# Patient Record
Sex: FEMALE | Race: WHITE
[De-identification: names, ages, dates, MRNs, and addresses within clinical notes are randomized per-mention and may not be internally consistent; named-entity substitution may affect disease eponyms.]

---

## 2017-09-03 ENCOUNTER — HOSPITAL (OUTPATIENT)
Dept: OTHER | Age: 67
End: 2017-09-03
Attending: EMERGENCY MEDICINE

## 2018-08-18 ENCOUNTER — HOSPITAL (OUTPATIENT)
Dept: OTHER | Age: 68
End: 2018-08-18
Attending: EMERGENCY MEDICINE

## 2018-08-18 LAB
APPEARANCE UR: CLEAR
BILIRUB UR QL STRIP: NEGATIVE
COLOR UR: YELLOW
GLUCOSE UR STRIP-MCNC: NEGATIVE MG/DL
HEMOCCULT STL QL: ABNORMAL
KETONES UR STRIP-MCNC: NEGATIVE MG/DL
LEUKOCYTE ESTERASE UR QL STRIP: NEGATIVE
NITRITE UR QL STRIP: NEGATIVE
PH UR STRIP: 6 UNIT (ref 5–7)
PROT UR STRIP-MCNC: NEGATIVE MG/DL
SP GR UR STRIP: 1.01 (ref 1–1.03)
UROBILINOGEN UR STRIP-MCNC: 0.2 MG/DL (ref 0–1)

## 2018-12-23 ENCOUNTER — HOSPITAL (OUTPATIENT)
Dept: OTHER | Age: 68
End: 2018-12-23
Attending: EMERGENCY MEDICINE

## 2018-12-23 LAB
FLUAV AG SPEC QL IA: ABNORMAL
FLUAV AG SPEC QL IA: POSITIVE
FLUBV AG SPEC QL IF: NEGATIVE
SPECIMEN SOURCE: ABNORMAL

## 2019-09-24 ENCOUNTER — HOSPITAL (OUTPATIENT)
Dept: OTHER | Age: 69
End: 2019-09-24
Attending: EMERGENCY MEDICINE

## 2019-10-10 ENCOUNTER — TELEPHONE (OUTPATIENT)
Dept: NEUROLOGY | Facility: CLINIC | Age: 69
End: 2019-10-10

## 2019-10-24 ENCOUNTER — OFFICE VISIT (OUTPATIENT)
Dept: NEUROLOGY | Facility: CLINIC | Age: 69
End: 2019-10-24
Payer: MEDICARE

## 2019-10-24 VITALS
SYSTOLIC BLOOD PRESSURE: 122 MMHG | RESPIRATION RATE: 16 BRPM | BODY MASS INDEX: 27.19 KG/M2 | HEART RATE: 74 BPM | HEIGHT: 59.75 IN | DIASTOLIC BLOOD PRESSURE: 60 MMHG | OXYGEN SATURATION: 98 % | WEIGHT: 138.5 LBS | TEMPERATURE: 98 F

## 2019-10-24 DIAGNOSIS — R20.2 PARESTHESIA OF BOTH FEET: ICD-10-CM

## 2019-10-24 DIAGNOSIS — I87.2 VENOUS INSUFFICIENCY: ICD-10-CM

## 2019-10-24 PROCEDURE — 99204 OFFICE O/P NEW MOD 45 MIN: CPT | Performed by: OTHER

## 2019-10-24 RX ORDER — LEVOTHYROXINE SODIUM 88 UG/1
TABLET ORAL
COMMUNITY
Start: 2019-09-18

## 2019-10-24 RX ORDER — GABAPENTIN 100 MG/1
100 CAPSULE ORAL 2 TIMES DAILY
Qty: 30 CAPSULE | Refills: 2 | Status: SHIPPED | OUTPATIENT
Start: 2019-10-24 | End: 2019-10-24

## 2019-10-24 RX ORDER — LISINOPRIL 5 MG/1
TABLET ORAL
COMMUNITY
Start: 2019-10-07

## 2019-10-24 RX ORDER — GABAPENTIN 100 MG/1
100 CAPSULE ORAL 2 TIMES DAILY
Qty: 60 CAPSULE | Refills: 2 | Status: SHIPPED | OUTPATIENT
Start: 2019-10-24

## 2019-10-24 RX ORDER — AMLODIPINE BESYLATE 5 MG/1
TABLET ORAL
COMMUNITY
Start: 2019-10-07

## 2019-10-24 NOTE — PROGRESS NOTES
HPI:    Patient ID: Gypsy Cook is a 77year old female. PCP: Dr Itzel Gutierrez    Thank you for referring this patient to us.  Below is the summary of my evaluation    HPI  Page Yohana is a 77year old nondiabetic female who presents for evaluation of par Rfl:   lisinopril 5 MG Oral Tab, , Disp: , Rfl:       Allergies:  Penicillins             SWELLING  Vicodin [Hydrocodon*    SWELLING  Celebrex [Celecoxib]    OTHER (SEE COMMENTS)    Comment:Headaches  Dilaudid [Hydrom[employer]*    OTHER (SEE COMMENTS)    Comment PROTEIN STUDY, MYESHA,         DIRECT, REFLEX TO 9 ENAS, SED RATE, WESTERGREN         (AUTOMATED), VITAMIN B6      (I87.2) Venous insufficiency  Plan: US VENOUS DOPPLER LEG BILAT - DIAG IMG         (CPT=93970)    Paresthesia of both feet possible small fiber

## 2019-10-25 ENCOUNTER — APPOINTMENT (OUTPATIENT)
Dept: LAB | Age: 69
End: 2019-10-25
Attending: Other
Payer: MEDICARE

## 2019-10-25 DIAGNOSIS — R20.2 PARESTHESIA OF BOTH FEET: ICD-10-CM

## 2019-10-25 PROCEDURE — 84207 ASSAY OF VITAMIN B-6: CPT

## 2019-10-25 PROCEDURE — 86334 IMMUNOFIX E-PHORESIS SERUM: CPT

## 2019-10-25 PROCEDURE — 36415 COLL VENOUS BLD VENIPUNCTURE: CPT

## 2019-10-25 PROCEDURE — 86038 ANTINUCLEAR ANTIBODIES: CPT

## 2019-10-25 PROCEDURE — 84165 PROTEIN E-PHORESIS SERUM: CPT

## 2019-10-25 PROCEDURE — 83883 ASSAY NEPHELOMETRY NOT SPEC: CPT

## 2019-10-25 PROCEDURE — 85652 RBC SED RATE AUTOMATED: CPT

## 2019-10-25 PROCEDURE — 82607 VITAMIN B-12: CPT

## 2019-11-04 ENCOUNTER — TELEPHONE (OUTPATIENT)
Dept: NEUROLOGY | Facility: CLINIC | Age: 69
End: 2019-11-04

## 2019-11-04 DIAGNOSIS — D47.2 MGUS (MONOCLONAL GAMMOPATHY OF UNKNOWN SIGNIFICANCE): ICD-10-CM

## 2019-11-04 DIAGNOSIS — R77.9 ABNORMALITY OF PLASMA PROTEIN: Primary | ICD-10-CM

## 2019-11-05 NOTE — TELEPHONE ENCOUNTER
Laci Mejia MD  You 10 minutes ago (1:32 PM)     Patient notified of lab results as stated below by Dr Natalia Grande and given #'s for hematology. Hematology referral placed. M spike in gamma region.  Refer to hematology for MGUS ( monoclonal gammap

## 2019-11-14 ENCOUNTER — OFFICE VISIT (OUTPATIENT)
Dept: HEMATOLOGY/ONCOLOGY | Age: 69
End: 2019-11-14
Attending: INTERNAL MEDICINE
Payer: MEDICARE

## 2019-11-14 VITALS
OXYGEN SATURATION: 98 % | BODY MASS INDEX: 27 KG/M2 | SYSTOLIC BLOOD PRESSURE: 145 MMHG | HEART RATE: 92 BPM | WEIGHT: 138 LBS | RESPIRATION RATE: 18 BRPM | TEMPERATURE: 98 F | DIASTOLIC BLOOD PRESSURE: 80 MMHG

## 2019-11-14 DIAGNOSIS — D47.2 MONOCLONAL GAMMOPATHY: Primary | ICD-10-CM

## 2019-11-14 PROCEDURE — 99205 OFFICE O/P NEW HI 60 MIN: CPT | Performed by: INTERNAL MEDICINE

## 2019-11-14 NOTE — PATIENT INSTRUCTIONS
For Dr. Francisco Cullen nurse line, call [phone] with any questions or concerns,  Monday through Friday 8:00 to 4:30, After hours or weekends for urgent needs.

## 2019-11-14 NOTE — CONSULTS
[facility]cer Center Report of Consultation    Patient Name: Tiffanie Marlow   YOB: 1950   Medical Record Number: [id number]   CSN: [id number]   Consulting Physician: Pro Gupta M.D. Referring Physician: No ref.  provider found    Date o together: Not on file        Attends [employer] service: Not on file        Active member of club or organization: Not on file        Attends meetings of clubs or organizations: Not on file        Relationship status: Not on file      Intimate partner viole heartburn or early satiety. Genitorurinary  Normal - No hematuria, dysuria, increased frequency, urgency, hesitancy or incontinence. Integumentary Normal - No chronic rashes, inflammation, ulcerations or skin changes.    Neurologic Normal - No headache, three. Coherent speech. Verbalizes understanding of our discussions today.        Laboratory:  Lab Results   Component Value Date    WBC 9.4 11/14/2019    RBC 4.59 11/14/2019    HGB 12.6 11/14/2019    HCT 39.0 11/14/2019    MCV 85.0 11/14/2019    MCH 27.5 1

## 2019-11-15 ENCOUNTER — HOSPITAL ENCOUNTER (OUTPATIENT)
Dept: GENERAL RADIOLOGY | Age: 69
Discharge: HOME | End: 2019-11-15
Attending: INTERNAL MEDICINE
Payer: MEDICARE

## 2019-11-15 DIAGNOSIS — D47.2 MONOCLONAL GAMMOPATHY: ICD-10-CM

## 2019-11-15 PROCEDURE — 77075 RADEX OSSEOUS SURVEY COMPL: CPT | Performed by: INTERNAL MEDICINE

## 2019-11-26 ENCOUNTER — APPOINTMENT (OUTPATIENT)
Dept: LAB | Age: 69
End: 2019-11-26
Attending: INTERNAL MEDICINE
Payer: MEDICARE

## 2019-11-26 DIAGNOSIS — D47.2 MONOCLONAL GAMMOPATHY: ICD-10-CM

## 2019-11-26 PROCEDURE — 83883 ASSAY NEPHELOMETRY NOT SPEC: CPT

## 2019-11-26 PROCEDURE — 84156 ASSAY OF PROTEIN URINE: CPT

## 2019-11-26 PROCEDURE — 86335 IMMUNFIX E-PHORSIS/URINE/CSF: CPT

## 2019-12-12 ENCOUNTER — APPOINTMENT (OUTPATIENT)
Dept: HEMATOLOGY/ONCOLOGY | Age: 69
End: 2019-12-12
Attending: INTERNAL MEDICINE
Payer: MEDICARE

## 2019-12-16 ENCOUNTER — HOSPITAL (OUTPATIENT)
Dept: OTHER | Age: 69
End: 2019-12-16
Attending: EMERGENCY MEDICINE

## 2019-12-19 ENCOUNTER — OFFICE VISIT (OUTPATIENT)
Dept: HEMATOLOGY/ONCOLOGY | Age: 69
End: 2019-12-19
Attending: INTERNAL MEDICINE
Payer: MEDICARE

## 2019-12-19 VITALS
HEART RATE: 76 BPM | DIASTOLIC BLOOD PRESSURE: 83 MMHG | SYSTOLIC BLOOD PRESSURE: 149 MMHG | TEMPERATURE: 99 F | BODY MASS INDEX: 32 KG/M2 | WEIGHT: 160.38 LBS | RESPIRATION RATE: 18 BRPM | OXYGEN SATURATION: 94 %

## 2019-12-19 DIAGNOSIS — M89.9 LYTIC LESION OF BONE ON X-RAY: ICD-10-CM

## 2019-12-19 DIAGNOSIS — D47.2 MONOCLONAL GAMMOPATHY: Primary | ICD-10-CM

## 2019-12-19 DIAGNOSIS — J18.9 PNEUMONIA DUE TO INFECTIOUS ORGANISM, UNSPECIFIED LATERALITY, UNSPECIFIED PART OF LUNG: ICD-10-CM

## 2019-12-19 PROBLEM — R93.1 ABNORMAL NUCLEAR CARDIAC IMAGING TEST: Status: ACTIVE | Noted: 2018-02-21

## 2019-12-19 PROBLEM — M16.12 ARTHRITIS OF LEFT HIP: Status: ACTIVE | Noted: 2019-12-19

## 2019-12-19 PROBLEM — E03.9 HYPOTHYROIDISM: Status: ACTIVE | Noted: 2019-12-19

## 2019-12-19 PROBLEM — I10 HTN, GOAL BELOW 130/80: Status: ACTIVE | Noted: 2017-06-30

## 2019-12-19 PROBLEM — M85.80 OSTEOPENIA: Status: ACTIVE | Noted: 2019-12-19

## 2019-12-19 PROBLEM — M47.812 CERVICAL ARTHRITIS: Status: ACTIVE | Noted: 2019-12-19

## 2019-12-19 PROCEDURE — 99214 OFFICE O/P EST MOD 30 MIN: CPT | Performed by: INTERNAL MEDICINE

## 2019-12-19 RX ORDER — AZITHROMYCIN 250 MG/1
250 TABLET, FILM COATED ORAL DAILY
Refills: 0 | COMMUNITY
Start: 2019-12-16

## 2019-12-19 NOTE — PROGRESS NOTES
Cancer Center Progress Note  Patient Name: Christine Simon   YOB: 1950   Medical Record Number: [id number]   CSN: [id number]   Attending Physician: Julian Rob M.D.        Date of Visit: 12/19/2019     Chief Complaint:  No chief comp Stress: Not on file    Relationships      Social connections:        Talks on phone: Not on file        Gets together: Not on file        Attends [employer] service: Not on file        Active member of club or organization: Not on file        Attends meetin constipation. Genitorurinary  No hematuria, dysuria, or incontinence. No abnormal bleeding. Integumentary No rashes or yellowing of the skin   Neurologic No headache, blurred vision, and no areas of focal weakness. Normal gait.    Psychiatric No insomni Latest Ref Range: 7 - 18 mg/dL  8   CREATININE Latest Ref Range: 0.55 - 1.02 mg/dL  0.62   CALCIUM Latest Ref Range: 8.5 - 10.1 mg/dL  9.3   BUN/CREAT Ratio Latest Ref Range: 10.0 - 20.0   12.9   eGFR NON-AFR.  AMERICAN Latest Ref Range: >=60   91   eGFR AF Latest Ref Range: 150.0 - 450.0 10(3)uL  300.0   RBC Latest Ref Range: 3.80 - 5.30 x10(6)uL  4.59   MCH Latest Ref Range: 26.0 - 34.0 pg  27.5   MCHC Latest Ref Range: 31.0 - 37.0 g/dL  32.3   MCV Latest Ref Range: 80.0 - 100.0 fL  85.0   RDW Latest Ref Ra will be indicated and possibly radiation. Pneumonia: I recommended that the patient hold off on her PET until her pneumonia resolves. Planned Follow Up:  3 months    I spent 25 minutes face to face with the patient.   More than 50% of that time was s

## 2020-01-13 ENCOUNTER — OFFICE VISIT (OUTPATIENT)
Dept: HEMATOLOGY/ONCOLOGY | Facility: HOSPITAL | Age: 70
End: 2020-01-13
Attending: INTERNAL MEDICINE
Payer: MEDICARE

## 2020-01-13 ENCOUNTER — TELEPHONE (OUTPATIENT)
Dept: HEMATOLOGY/ONCOLOGY | Facility: HOSPITAL | Age: 70
End: 2020-01-13

## 2020-01-13 VITALS
SYSTOLIC BLOOD PRESSURE: 166 MMHG | WEIGHT: 163 LBS | BODY MASS INDEX: 32 KG/M2 | HEART RATE: 87 BPM | DIASTOLIC BLOOD PRESSURE: 84 MMHG | HEIGHT: 59.76 IN | RESPIRATION RATE: 16 BRPM | OXYGEN SATURATION: 99 % | TEMPERATURE: 98 F

## 2020-01-13 RX ORDER — ALPRAZOLAM 0.25 MG/1
0.25 TABLET ORAL ONCE
Qty: 2 TABLET | Refills: 0 | Status: SHIPPED | OUTPATIENT
Start: 2020-01-13 | End: 2020-01-13

## 2020-01-13 NOTE — PROGRESS NOTES
Cancer Center Progress Note  Patient Name: Sharmin Saenz   YOB: 1950   Medical Record Number: [id number]   CSN: [id number]   Attending Physician: Roro Quarles M.D.        Date of Visit: 1/13/2020     Chief Complaint:  Patient presen Active member of club or organization: Not on file        Attends meetings of clubs or organizations: Not on file        Relationship status: Not on file      Intimate partner violence:        Fear of current or ex partner: Not on file        Emotional incontinence. No abnormal bleeding. Integumentary No rashes or yellowing of the skin   Neurologic No headache, blurred vision, and no areas of focal weakness. Normal gait. Psychiatric No insomnia, depression, karlee or mood swings.          Vital Signs: CREATININE Latest Ref Range: 0.55 - 1.02 mg/dL  0.62   CALCIUM Latest Ref Range: 8.5 - 10.1 mg/dL  9.3   BUN/CREAT Ratio Latest Ref Range: 10.0 - 20.0   12.9   eGFR NON-AFR.  AMERICAN Latest Ref Range: >=60   91   eGFR  Latest Ref Range: > 10(3)uL  300.0   RBC Latest Ref Range: 3.80 - 5.30 x10(6)uL  4.59   MCH Latest Ref Range: 26.0 - 34.0 pg  27.5   MCHC Latest Ref Range: 31.0 - 37.0 g/dL  32.3   MCV Latest Ref Range: 80.0 - 100.0 fL  85.0   RDW Latest Ref Range: 11.0 - 15.0 %  15.7 (H)   R radiation. Pneumonia: I recommended that the patient hold off on her PET until her pneumonia resolves. Planned Follow Up:  3 months    I spent 25 minutes face to face with the patient.   More than 50% of that time was spent counseling the patient and

## 2020-01-13 NOTE — TELEPHONE ENCOUNTER
Patient needs medication before her Pet scan to take at appt. because she gets very nervous. Please call.  Thank you

## 2020-03-20 ENCOUNTER — LAB ENCOUNTER (OUTPATIENT)
Dept: LAB | Age: 70
End: 2020-03-20
Attending: INTERNAL MEDICINE
Payer: MEDICARE

## 2020-03-20 DIAGNOSIS — D47.2 MONOCLONAL GAMMOPATHY: ICD-10-CM

## 2020-03-20 LAB
ALBUMIN SERPL-MCNC: 4 G/DL (ref 3.4–5)
ALBUMIN/GLOB SERPL: 0.9 {RATIO} (ref 1–2)
ALP LIVER SERPL-CCNC: 111 U/L (ref 55–142)
ALT SERPL-CCNC: 37 U/L (ref 13–56)
ANION GAP SERPL CALC-SCNC: 4 MMOL/L (ref 0–18)
AST SERPL-CCNC: 21 U/L (ref 15–37)
BASOPHILS # BLD AUTO: 0.04 X10(3) UL (ref 0–0.2)
BASOPHILS NFR BLD AUTO: 0.5 %
BILIRUB SERPL-MCNC: 0.3 MG/DL (ref 0.1–2)
BUN BLD-MCNC: 9 MG/DL (ref 7–18)
BUN/CREAT SERPL: 13.6 (ref 10–20)
CALCIUM BLD-MCNC: 8.6 MG/DL (ref 8.5–10.1)
CHLORIDE SERPL-SCNC: 106 MMOL/L (ref 98–112)
CO2 SERPL-SCNC: 28 MMOL/L (ref 21–32)
CREAT BLD-MCNC: 0.66 MG/DL (ref 0.55–1.02)
DEPRECATED RDW RBC AUTO: 50.6 FL (ref 35.1–46.3)
EOSINOPHIL # BLD AUTO: 0.21 X10(3) UL (ref 0–0.7)
EOSINOPHIL NFR BLD AUTO: 2.5 %
ERYTHROCYTE [DISTWIDTH] IN BLOOD BY AUTOMATED COUNT: 15.9 % (ref 11–15)
GLOBULIN PLAS-MCNC: 4.3 G/DL (ref 2.8–4.4)
GLUCOSE BLD-MCNC: 92 MG/DL (ref 70–99)
HCT VFR BLD AUTO: 42 % (ref 35–48)
HGB BLD-MCNC: 13.3 G/DL (ref 12–16)
IMM GRANULOCYTES # BLD AUTO: 0.02 X10(3) UL (ref 0–1)
IMM GRANULOCYTES NFR BLD: 0.2 %
LYMPHOCYTES # BLD AUTO: 2.72 X10(3) UL (ref 1–4)
LYMPHOCYTES NFR BLD AUTO: 31.9 %
M PROTEIN MFR SERPL ELPH: 8.3 G/DL (ref 6.4–8.2)
MCH RBC QN AUTO: 27.4 PG (ref 26–34)
MCHC RBC AUTO-ENTMCNC: 31.7 G/DL (ref 31–37)
MCV RBC AUTO: 86.6 FL (ref 80–100)
MONOCYTES # BLD AUTO: 0.82 X10(3) UL (ref 0.1–1)
MONOCYTES NFR BLD AUTO: 9.6 %
NEUTROPHILS # BLD AUTO: 4.73 X10 (3) UL (ref 1.5–7.7)
NEUTROPHILS # BLD AUTO: 4.73 X10(3) UL (ref 1.5–7.7)
NEUTROPHILS NFR BLD AUTO: 55.3 %
OSMOLALITY SERPL CALC.SUM OF ELEC: 284 MOSM/KG (ref 275–295)
PATIENT FASTING Y/N/NP: NO
PLATELET # BLD AUTO: 347 10(3)UL (ref 150–450)
POTASSIUM SERPL-SCNC: 3.9 MMOL/L (ref 3.5–5.1)
RBC # BLD AUTO: 4.85 X10(6)UL (ref 3.8–5.3)
SODIUM SERPL-SCNC: 138 MMOL/L (ref 136–145)
WBC # BLD AUTO: 8.5 X10(3) UL (ref 4–11)

## 2020-03-20 PROCEDURE — 86334 IMMUNOFIX E-PHORESIS SERUM: CPT

## 2020-03-20 PROCEDURE — 80053 COMPREHEN METABOLIC PANEL: CPT

## 2020-03-20 PROCEDURE — 83883 ASSAY NEPHELOMETRY NOT SPEC: CPT

## 2020-03-20 PROCEDURE — 85025 COMPLETE CBC W/AUTO DIFF WBC: CPT

## 2020-03-20 PROCEDURE — 84165 PROTEIN E-PHORESIS SERUM: CPT

## 2020-03-20 PROCEDURE — 36415 COLL VENOUS BLD VENIPUNCTURE: CPT

## 2020-03-23 ENCOUNTER — TELEPHONE (OUTPATIENT)
Dept: HEMATOLOGY/ONCOLOGY | Facility: HOSPITAL | Age: 70
End: 2020-03-23

## 2020-03-23 LAB
ALBUMIN SERPL ELPH-MCNC: 4.42 G/DL (ref 3.75–5.21)
ALBUMIN/GLOB SERPL: 1.39 {RATIO} (ref 1–2)
ALPHA1 GLOB SERPL ELPH-MCNC: 0.28 G/DL (ref 0.19–0.46)
ALPHA2 GLOB SERPL ELPH-MCNC: 0.78 G/DL (ref 0.48–1.05)
B-GLOBULIN SERPL ELPH-MCNC: 0.83 G/DL (ref 0.68–1.23)
GAMMA GLOB SERPL ELPH-MCNC: 1.29 G/DL (ref 0.62–1.7)
KAPPA FREE LIGHT CHAIN: 11.86 MG/DL (ref 0.33–1.94)
KAPPA/LAMBDA FLC RATIO: 12.9 (ref 0.26–1.65)
LAMBDA FREE LIGHT CHAIN: 0.92 MG/DL (ref 0.57–2.63)
M PROTEIN MFR SERPL ELPH: 7.6 G/DL (ref 6.4–8.2)
M-SPIKE 1: 0.62 G/DL (ref ?–0)

## 2020-03-23 NOTE — TELEPHONE ENCOUNTER
Gabriella Medeiros is returning your call from Dr. Shawn Markham office    Pt had a question about the PET scan tomorrow, asking if she need pre medication similar to CT contrast allergy.    I let her know I confirmed with PET and this is not a contrast. Explained that i

## 2020-03-23 NOTE — TELEPHONE ENCOUNTER
Ricke Bernheim called and was looking to speak with a nurse about her upcoming PET scan and how some medications that she is supposed to be on could interact. She asked that she please get a call back to go over this.      LVM returning her call to address her ques

## 2020-03-24 ENCOUNTER — HOSPITAL ENCOUNTER (OUTPATIENT)
Dept: NUCLEAR MEDICINE | Facility: HOSPITAL | Age: 70
Discharge: HOME | End: 2020-03-24
Attending: INTERNAL MEDICINE
Payer: MEDICARE

## 2020-03-24 ENCOUNTER — TELEPHONE (OUTPATIENT)
Dept: HEMATOLOGY/ONCOLOGY | Facility: HOSPITAL | Age: 70
End: 2020-03-24

## 2020-03-24 DIAGNOSIS — D47.2 MONOCLONAL GAMMOPATHY: ICD-10-CM

## 2020-03-24 DIAGNOSIS — M89.9 LYTIC LESION OF BONE ON X-RAY: ICD-10-CM

## 2020-03-24 LAB — GLUCOSE BLD-MCNC: 102 MG/DL (ref 70–99)

## 2020-03-24 PROCEDURE — 78816 PET IMAGE W/CT FULL BODY: CPT | Performed by: INTERNAL MEDICINE

## 2020-03-24 PROCEDURE — 82962 GLUCOSE BLOOD TEST: CPT

## 2020-03-24 NOTE — TELEPHONE ENCOUNTER
Per Dr. Janae Elizabeth -- due to virus outbreak, cancel follow up appt. Will go over PET scan results on the phone. Message left for the patient.

## 2020-03-26 DIAGNOSIS — D47.2 MONOCLONAL GAMMOPATHY: Primary | ICD-10-CM

## 2020-03-26 NOTE — PROGRESS NOTES
Hematology Telephone Note    I discussed the results of the patient's PET and Labs with her by phone. The PET is negative for signs of active myeloma. Her IgG monoclonal spike is small, but she has elevated free K LC.  Her urine is negative for Bence Minnie Rueda

## 2020-03-27 ENCOUNTER — APPOINTMENT (OUTPATIENT)
Dept: HEMATOLOGY/ONCOLOGY | Facility: HOSPITAL | Age: 70
End: 2020-03-27
Attending: INTERNAL MEDICINE
Payer: MEDICARE

## 2021-03-05 DIAGNOSIS — Z23 NEED FOR VACCINATION: ICD-10-CM

## 2021-04-28 NOTE — PROGRESS NOTES
Pt here for MD consultation. Referred by Dr. Kerri Galeas. Labs drawn on 10/25/19.       Outpatient Oncology Care Plan  Problem list:  knowledge deficit    Problems related to:    disease/disease progression    Interventions:  provided general teaching    Expec
reactions to medicines